# Patient Record
Sex: FEMALE
[De-identification: names, ages, dates, MRNs, and addresses within clinical notes are randomized per-mention and may not be internally consistent; named-entity substitution may affect disease eponyms.]

---

## 2017-01-26 ENCOUNTER — RX RENEWAL (OUTPATIENT)
Age: 52
End: 2017-01-26

## 2017-03-07 ENCOUNTER — APPOINTMENT (OUTPATIENT)
Dept: ENDOCRINOLOGY | Facility: CLINIC | Age: 52
End: 2017-03-07

## 2017-03-29 ENCOUNTER — MEDICATION RENEWAL (OUTPATIENT)
Age: 52
End: 2017-03-29

## 2017-08-29 ENCOUNTER — APPOINTMENT (OUTPATIENT)
Dept: ENDOCRINOLOGY | Facility: CLINIC | Age: 52
End: 2017-08-29
Payer: COMMERCIAL

## 2017-08-29 VITALS
HEIGHT: 70 IN | DIASTOLIC BLOOD PRESSURE: 65 MMHG | SYSTOLIC BLOOD PRESSURE: 134 MMHG | BODY MASS INDEX: 21.98 KG/M2 | WEIGHT: 153.56 LBS | HEART RATE: 83 BPM

## 2017-08-29 PROCEDURE — 99214 OFFICE O/P EST MOD 30 MIN: CPT

## 2017-08-30 LAB
25(OH)D3 SERPL-MCNC: 70 NG/ML
ANION GAP SERPL CALC-SCNC: 14 MMOL/L
BUN SERPL-MCNC: 15 MG/DL
CALCIUM SERPL-MCNC: 10.4 MG/DL
CHLORIDE SERPL-SCNC: 101 MMOL/L
CO2 SERPL-SCNC: 27 MMOL/L
CREAT SERPL-MCNC: 0.86 MG/DL
GLUCOSE SERPL-MCNC: 95 MG/DL
POTASSIUM SERPL-SCNC: 4.9 MMOL/L
SODIUM SERPL-SCNC: 142 MMOL/L
T3 SERPL-MCNC: 105 NG/DL
T4 FREE SERPL-MCNC: 1.1 NG/DL
TSH SERPL-ACNC: 2.72 UIU/ML
VIT B12 SERPL-MCNC: 338 PG/ML

## 2018-05-21 ENCOUNTER — APPOINTMENT (OUTPATIENT)
Dept: ENDOCRINOLOGY | Facility: CLINIC | Age: 53
End: 2018-05-21
Payer: COMMERCIAL

## 2018-05-21 VITALS
DIASTOLIC BLOOD PRESSURE: 68 MMHG | WEIGHT: 155 LBS | HEART RATE: 79 BPM | BODY MASS INDEX: 22.19 KG/M2 | HEIGHT: 70 IN | SYSTOLIC BLOOD PRESSURE: 109 MMHG

## 2018-05-21 PROCEDURE — 99213 OFFICE O/P EST LOW 20 MIN: CPT

## 2018-05-24 LAB
T3 SERPL-MCNC: 84 NG/DL
T4 FREE SERPL-MCNC: 1.2 NG/DL
TSH SERPL-ACNC: 2.23 UIU/ML

## 2018-12-11 ENCOUNTER — MEDICATION RENEWAL (OUTPATIENT)
Age: 53
End: 2018-12-11

## 2019-06-18 ENCOUNTER — MEDICATION RENEWAL (OUTPATIENT)
Age: 54
End: 2019-06-18

## 2019-07-02 ENCOUNTER — APPOINTMENT (OUTPATIENT)
Dept: ENDOCRINOLOGY | Facility: CLINIC | Age: 54
End: 2019-07-02
Payer: COMMERCIAL

## 2019-07-02 VITALS
BODY MASS INDEX: 23.1 KG/M2 | DIASTOLIC BLOOD PRESSURE: 69 MMHG | SYSTOLIC BLOOD PRESSURE: 102 MMHG | HEART RATE: 75 BPM | WEIGHT: 161 LBS

## 2019-07-02 PROCEDURE — 99213 OFFICE O/P EST LOW 20 MIN: CPT

## 2019-07-02 NOTE — ASSESSMENT
[FreeTextEntry1] : Hashimoto's. goal TSH 0.5-4.0 range.  will send Tirosint renewal after getting labs back.\par Suggested exercising with weights to build muscle mass (she already is) to increase metabolism.  also suggested 12-14h intermittent fasting.\par \par RTO 1 year if labs are normal.

## 2019-07-02 NOTE — DATA REVIEWED
[FreeTextEntry1] : 5/18: TSH 2.23, T3 84, free T4 1.2\par 8/17: TSH 2.72, free T4 1.1, T3 105, 25D 70, B12 338\par 2/16: TSH 1.30, 25D 27.9\par 8/15: TSH 2.79

## 2019-07-02 NOTE — PHYSICAL EXAM
[Alert] : alert [Healthy Appearance] : healthy appearance [Normal Voice/Communication] : normal voice communication [Normal Hearing] : hearing was normal [No Lid Lag] : no lid lag [No Proptosis] : no proptosis [No Thyroid Nodules] : there were no palpable thyroid nodules [Thyroid Not Enlarged] : the thyroid was not enlarged [Clear to Auscultation] : lungs were clear to auscultation bilaterally [Regular Rhythm] : with a regular rhythm [Normal S1, S2] : normal S1 and S2 [Normal Affect] : the affect was normal [Normal Mood] : the mood was normal [Acanthosis Nigricans] : no acanthosis nigricans

## 2019-07-02 NOTE — HISTORY OF PRESENT ILLNESS
[FreeTextEntry1] : now officially in menopause.  LMP June 2018.\par no menopause symptoms (no hot flashes, anxiety or insomnia) but feeling sluggish and gaining weight\par going to gym and eating less, but still gaining\par no constipation. tends to be cold\par no palpitations or feeling jittery \par \par PMH: food allergies but tested negative for celiac\par \par Meds: Tirosint ESTHER 88mcg/day (Tirosint is gluten free)\par liothyronine 5mcg am\par

## 2019-07-03 LAB
T3 SERPL-MCNC: 96 NG/DL
T4 FREE SERPL-MCNC: 1.1 NG/DL
TSH SERPL-ACNC: 13.1 UIU/ML

## 2019-09-11 ENCOUNTER — MEDICATION RENEWAL (OUTPATIENT)
Age: 54
End: 2019-09-11

## 2020-07-21 ENCOUNTER — APPOINTMENT (OUTPATIENT)
Dept: ENDOCRINOLOGY | Facility: CLINIC | Age: 55
End: 2020-07-21

## 2020-10-14 ENCOUNTER — APPOINTMENT (OUTPATIENT)
Dept: ENDOCRINOLOGY | Facility: CLINIC | Age: 55
End: 2020-10-14
Payer: COMMERCIAL

## 2020-10-14 VITALS
WEIGHT: 158 LBS | SYSTOLIC BLOOD PRESSURE: 119 MMHG | DIASTOLIC BLOOD PRESSURE: 71 MMHG | BODY MASS INDEX: 22.62 KG/M2 | HEART RATE: 74 BPM | HEIGHT: 70 IN

## 2020-10-14 PROCEDURE — 99213 OFFICE O/P EST LOW 20 MIN: CPT

## 2020-10-14 NOTE — PHYSICAL EXAM
[Alert] : alert [Healthy Appearance] : healthy appearance [No Proptosis] : no proptosis [Normal Hearing] : hearing was normal [No Lid Lag] : no lid lag [No LAD] : no lymphadenopathy [Thyroid Not Enlarged] : the thyroid was not enlarged [Clear to Auscultation] : lungs were clear to auscultation bilaterally [Normal S1, S2] : normal S1 and S2 [Regular Rhythm] : with a regular rhythm [Normal Affect] : the affect was normal [Normal Mood] : the mood was normal [Acanthosis Nigricans] : no acanthosis nigricans

## 2020-10-14 NOTE — DATA REVIEWED
[FreeTextEntry1] : 7/19: TSH 13.10, T3 96, free T4 1.1\par 5/18: TSH 2.23, T3 84, free T4 1.2\par 8/17: TSH 2.72, free T4 1.1, T3 105, 25D 70, B12 338\par 2/16: TSH 1.30, 25D 27.9\par 8/15: TSH 2.79

## 2020-10-14 NOTE — HISTORY OF PRESENT ILLNESS
[FreeTextEntry1] : last fall, lost 30lb due to stress with mother, and then lost a lot of hair\par saw hair specialist, rx'd Rogaine and another liquid treatment that she is still taking.  After gaining the weight back, then hair returned to normal, took about 4 months.\par no palpitations or feeling jittery \par some constipation, more than before, but usually resolves itself, so probably related to diet\par always cold\par declines flu vaccine\par staying active, commuting to work this whole time\par \par PMH: food allergies but tested negative for celiac\par \par Meds: Tirosint ESTHER 88mcg/day (Tirosint is gluten free)\par liothyronine 5mcg am\par Rogaine\par

## 2020-10-14 NOTE — ASSESSMENT
[FreeTextEntry1] : Hashimoto's. goal TSH 0.5-4.0 range.  will send Tirosint renewal after getting labs back.\par \par RTO 1 year if labs are normal.

## 2020-10-15 LAB
T3 SERPL-MCNC: 108 NG/DL
T4 FREE SERPL-MCNC: 1.3 NG/DL
TSH SERPL-ACNC: 2.65 UIU/ML

## 2021-10-28 ENCOUNTER — APPOINTMENT (OUTPATIENT)
Dept: ENDOCRINOLOGY | Facility: CLINIC | Age: 56
End: 2021-10-28

## 2022-04-22 ENCOUNTER — APPOINTMENT (OUTPATIENT)
Dept: ENDOCRINOLOGY | Facility: CLINIC | Age: 57
End: 2022-04-22
Payer: COMMERCIAL

## 2022-04-22 VITALS
BODY MASS INDEX: 22.1 KG/M2 | WEIGHT: 154 LBS | SYSTOLIC BLOOD PRESSURE: 120 MMHG | HEART RATE: 89 BPM | DIASTOLIC BLOOD PRESSURE: 79 MMHG

## 2022-04-22 PROCEDURE — 99213 OFFICE O/P EST LOW 20 MIN: CPT

## 2022-04-22 NOTE — HISTORY OF PRESENT ILLNESS
[FreeTextEntry1] : She thinks she is ok, thyroid-wise\par had a bout of nausea/vomiting that lasted a few days, no clear reason why.  Still maintains gluten free diet.\par weight fluctuating and currently is down a few lb.   some hair loss, seeing hair specialist.\par unable to tolerate white wine anymore (which she used to drink) -- gets nauseous and very fatigued.   able to drink champagne and vodka, though.\par no fatigue usually.  no constipation or diarrhea.  no palpitations or feeling jittery \par not taking any supplements, not taking vitamin D \par \par PMH: food allergies but tested negative for celiac\par \par Meds: Tirosint ESTHER 88mcg/day (Tirosint is gluten free)\par liothyronine 5mcg am\par Rogaine\par

## 2022-04-22 NOTE — DATA REVIEWED
[FreeTextEntry1] : 10/20: TSH 2.65\par 7/19: TSH 13.10, T3 96, free T4 1.1\par 5/18: TSH 2.23, T3 84, free T4 1.2\par 8/17: TSH 2.72, free T4 1.1, T3 105, 25D 70, B12 338\par 2/16: TSH 1.30, 25D 27.9\par 8/15: TSH 2.79

## 2022-04-22 NOTE — ASSESSMENT
[FreeTextEntry1] : Hashimoto's. goal TSH 0.5-4.0 range.  will send Tirosint renewal after getting labs back.\par Levothyroxine (Non Tirosint  brand) likely does not contain much gluten (if any) but she doesn't want to change; says she will change when she runs out of money.\par recheck vitamin D since she is not taking lorna now.  Restart vitamin D supplementation if 25 D < 25 ng//ml\par \par RTO 1 year if labs are normal.

## 2022-04-25 LAB
25(OH)D3 SERPL-MCNC: 34.8 NG/ML
ANION GAP SERPL CALC-SCNC: 10 MMOL/L
BUN SERPL-MCNC: 15 MG/DL
CALCIUM SERPL-MCNC: 10.4 MG/DL
CHLORIDE SERPL-SCNC: 105 MMOL/L
CO2 SERPL-SCNC: 27 MMOL/L
CREAT SERPL-MCNC: 0.68 MG/DL
EGFR: 102 ML/MIN/1.73M2
GLUCOSE SERPL-MCNC: 106 MG/DL
POTASSIUM SERPL-SCNC: 4.9 MMOL/L
SODIUM SERPL-SCNC: 142 MMOL/L
T3 SERPL-MCNC: 100 NG/DL
T4 FREE SERPL-MCNC: 1.3 NG/DL
TSH SERPL-ACNC: 2.28 UIU/ML

## 2023-07-27 ENCOUNTER — RX RENEWAL (OUTPATIENT)
Age: 58
End: 2023-07-27

## 2023-08-29 ENCOUNTER — RX RENEWAL (OUTPATIENT)
Age: 58
End: 2023-08-29

## 2023-10-19 ENCOUNTER — APPOINTMENT (OUTPATIENT)
Dept: ENDOCRINOLOGY | Facility: CLINIC | Age: 58
End: 2023-10-19
Payer: COMMERCIAL

## 2023-10-19 VITALS
WEIGHT: 161 LBS | HEART RATE: 69 BPM | DIASTOLIC BLOOD PRESSURE: 66 MMHG | BODY MASS INDEX: 23.1 KG/M2 | SYSTOLIC BLOOD PRESSURE: 114 MMHG

## 2023-10-19 PROCEDURE — 99213 OFFICE O/P EST LOW 20 MIN: CPT

## 2023-10-20 LAB
T3 SERPL-MCNC: 115 NG/DL
T4 FREE SERPL-MCNC: 1.4 NG/DL
TSH SERPL-ACNC: 1.05 UIU/ML

## 2023-10-20 RX ORDER — LEVOTHYROXINE SODIUM 88 UG/1
88 TABLET ORAL
Qty: 90 | Refills: 3 | Status: ACTIVE | COMMUNITY
Start: 2023-10-20 | End: 1900-01-01

## 2024-10-10 ENCOUNTER — RX RENEWAL (OUTPATIENT)
Age: 59
End: 2024-10-10

## 2024-10-11 ENCOUNTER — RX RENEWAL (OUTPATIENT)
Age: 59
End: 2024-10-11

## 2025-01-14 ENCOUNTER — LABORATORY RESULT (OUTPATIENT)
Age: 60
End: 2025-01-14

## 2025-01-14 ENCOUNTER — APPOINTMENT (OUTPATIENT)
Dept: ENDOCRINOLOGY | Facility: CLINIC | Age: 60
End: 2025-01-14
Payer: COMMERCIAL

## 2025-01-14 VITALS
DIASTOLIC BLOOD PRESSURE: 76 MMHG | HEART RATE: 82 BPM | SYSTOLIC BLOOD PRESSURE: 126 MMHG | BODY MASS INDEX: 23.19 KG/M2 | WEIGHT: 162 LBS | HEIGHT: 70 IN

## 2025-01-14 PROCEDURE — 99213 OFFICE O/P EST LOW 20 MIN: CPT

## 2025-01-14 PROCEDURE — 36415 COLL VENOUS BLD VENIPUNCTURE: CPT

## 2025-01-15 LAB — TSH SERPL-ACNC: 4.64 UIU/ML

## 2025-03-31 ENCOUNTER — RX RENEWAL (OUTPATIENT)
Age: 60
End: 2025-03-31

## 2025-04-01 ENCOUNTER — RX RENEWAL (OUTPATIENT)
Age: 60
End: 2025-04-01